# Patient Record
Sex: FEMALE | Race: ASIAN
[De-identification: names, ages, dates, MRNs, and addresses within clinical notes are randomized per-mention and may not be internally consistent; named-entity substitution may affect disease eponyms.]

---

## 2017-04-05 ENCOUNTER — HOSPITAL ENCOUNTER (OUTPATIENT)
Age: 37
Discharge: HOME | End: 2017-04-05
Payer: MEDICAID

## 2017-04-05 DIAGNOSIS — Z12.11: Primary | ICD-10-CM

## 2017-04-05 DIAGNOSIS — K63.4: ICD-10-CM

## 2017-04-05 DIAGNOSIS — K64.0: ICD-10-CM

## 2017-04-05 DIAGNOSIS — Z80.0: ICD-10-CM

## 2017-04-05 PROCEDURE — 45378 DIAGNOSTIC COLONOSCOPY: CPT

## 2017-04-05 PROCEDURE — 0DJD8ZZ INSPECTION OF LOWER INTESTINAL TRACT, VIA NATURAL OR ARTIFICIAL OPENING ENDOSCOPIC: ICD-10-PCS | Performed by: INTERNAL MEDICINE

## 2018-06-18 ENCOUNTER — HOSPITAL ENCOUNTER (OUTPATIENT)
Dept: HOSPITAL 76 - LAB.N | Age: 38
Discharge: HOME | End: 2018-06-18
Attending: FAMILY MEDICINE
Payer: MEDICAID

## 2018-06-18 DIAGNOSIS — M25.561: Primary | ICD-10-CM

## 2018-06-18 DIAGNOSIS — I63.40: ICD-10-CM

## 2018-06-18 DIAGNOSIS — E66.01: ICD-10-CM

## 2018-06-18 PROCEDURE — 86430 RHEUMATOID FACTOR TEST QUAL: CPT

## 2018-06-18 PROCEDURE — 36415 COLL VENOUS BLD VENIPUNCTURE: CPT

## 2018-06-18 PROCEDURE — 85651 RBC SED RATE NONAUTOMATED: CPT

## 2018-06-18 PROCEDURE — 85027 COMPLETE CBC AUTOMATED: CPT

## 2018-06-18 PROCEDURE — 86140 C-REACTIVE PROTEIN: CPT

## 2018-06-18 PROCEDURE — 86038 ANTINUCLEAR ANTIBODIES: CPT

## 2018-06-18 PROCEDURE — 80053 COMPREHEN METABOLIC PANEL: CPT

## 2018-06-19 LAB
ALBUMIN DIAFP-MCNC: 3.4 G/DL
ALBUMIN/GLOB SERPL: 0.9 {RATIO}
ALP SERPL-CCNC: 66 IU/L
ALT SERPL W P-5'-P-CCNC: 20 IU/L
ANION GAP SERPL CALCULATED.4IONS-SCNC: 6 MMOL/L
AST SERPL W P-5'-P-CCNC: 20 IU/L
BILIRUB BLD-MCNC: 0.3 MG/DL
BUN SERPL-MCNC: 9 MG/DL
CALCIUM UR-MCNC: 8.4 MG/DL
CHLORIDE SERPL-SCNC: 103 MMOL/L
CO2 SERPL-SCNC: 25 MMOL/L
CREAT SERPLBLD-SCNC: 0.5 MG/DL
CRP SERPL-MCNC: 1.6 MG/DL
ERYTHROCYTE [DISTWIDTH] IN BLOOD BY AUTOMATED COUNT: 17.7 %
GFRSERPLBLD MDRD-ARVRAT: 138 ML/MIN/{1.73_M2}
GLOBULIN SER-MCNC: 3.8 G/DL
GLUCOSE SERPL-MCNC: 190 MG/DL
HGB UR QL STRIP: 11.2 G/DL
MCH RBC QN AUTO: 23.9 PG
MCHC RBC AUTO-ENTMCNC: 32.2 G/DL
MCV RBC AUTO: 74.3 FL
NEUTROPHILS # SNV AUTO: 7.9 X10^3/UL
PDW BLD AUTO: 7.9 FL
PLATELET # BLD: 297 10^3/UL
PROT SPEC-MCNC: 7.2 G/DL
RBC MAR: 4.69 10^6/UL
RHEUMATOID FACT SER QL: NEGATIVE
SODIUM SERPLBLD-SCNC: 134 MMOL/L

## 2018-06-21 LAB — ANA SER QL: NEGATIVE

## 2019-04-11 ENCOUNTER — HOSPITAL ENCOUNTER (EMERGENCY)
Dept: HOSPITAL 76 - ED | Age: 39
Discharge: HOME | End: 2019-04-11
Payer: MEDICAID

## 2019-04-11 ENCOUNTER — HOSPITAL ENCOUNTER (OUTPATIENT)
Dept: HOSPITAL 76 - EMS | Age: 39
Discharge: ANOTHER HOSPITAL | End: 2019-04-11
Attending: SURGERY
Payer: MEDICAID

## 2019-04-11 VITALS — DIASTOLIC BLOOD PRESSURE: 91 MMHG | SYSTOLIC BLOOD PRESSURE: 152 MMHG

## 2019-04-11 DIAGNOSIS — R41.82: ICD-10-CM

## 2019-04-11 DIAGNOSIS — I10: ICD-10-CM

## 2019-04-11 DIAGNOSIS — R11.2: ICD-10-CM

## 2019-04-11 DIAGNOSIS — R10.31: Primary | ICD-10-CM

## 2019-04-11 DIAGNOSIS — E11.9: ICD-10-CM

## 2019-04-11 DIAGNOSIS — R10.9: Primary | ICD-10-CM

## 2019-04-11 LAB
ALBUMIN DIAFP-MCNC: 3.6 G/DL (ref 3.2–5.5)
ALBUMIN/GLOB SERPL: 0.9 {RATIO} (ref 1–2.2)
ALP SERPL-CCNC: 71 IU/L (ref 42–121)
ALT SERPL W P-5'-P-CCNC: 28 IU/L (ref 10–60)
ANION GAP SERPL CALCULATED.4IONS-SCNC: 11 MMOL/L (ref 6–13)
AST SERPL W P-5'-P-CCNC: 24 IU/L (ref 10–42)
BASOPHILS NFR BLD AUTO: 0.1 10^3/UL (ref 0–0.1)
BASOPHILS NFR BLD AUTO: 0.4 %
BILIRUB BLD-MCNC: 0.4 MG/DL (ref 0.2–1)
BUN SERPL-MCNC: 11 MG/DL (ref 6–20)
CALCIUM UR-MCNC: 8.6 MG/DL (ref 8.5–10.3)
CHLORIDE SERPL-SCNC: 102 MMOL/L (ref 101–111)
CLARITY UR REFRACT.AUTO: CLEAR
CO2 SERPL-SCNC: 24 MMOL/L (ref 21–32)
CREAT SERPLBLD-SCNC: 0.5 MG/DL (ref 0.4–1)
EOSINOPHIL # BLD AUTO: 0 10^3/UL (ref 0–0.7)
EOSINOPHIL NFR BLD AUTO: 0.1 %
ERYTHROCYTE [DISTWIDTH] IN BLOOD BY AUTOMATED COUNT: 16.8 % (ref 12–15)
GFRSERPLBLD MDRD-ARVRAT: 137 ML/MIN/{1.73_M2} (ref 89–?)
GLOBULIN SER-MCNC: 4.1 G/DL (ref 2.1–4.2)
GLUCOSE SERPL-MCNC: 142 MG/DL (ref 70–100)
GLUCOSE UR QL STRIP.AUTO: NEGATIVE MG/DL
HCG UR QL: NEGATIVE
HGB UR QL STRIP: 10.4 G/DL (ref 12–16)
KETONES UR QL STRIP.AUTO: (no result) MG/DL
LIPASE SERPL-CCNC: 40 U/L (ref 22–51)
LYMPHOCYTES # SPEC AUTO: 1.7 10^3/UL (ref 1.5–3.5)
LYMPHOCYTES NFR BLD AUTO: 13 %
MCH RBC QN AUTO: 22.1 PG (ref 27–31)
MCHC RBC AUTO-ENTMCNC: 31.4 G/DL (ref 32–36)
MCV RBC AUTO: 70.2 FL (ref 81–99)
MONOCYTES # BLD AUTO: 0.4 10^3/UL (ref 0–1)
MONOCYTES NFR BLD AUTO: 3.1 %
NEUTROPHILS # BLD AUTO: 10.7 10^3/UL (ref 1.5–6.6)
NEUTROPHILS # SNV AUTO: 12.8 X10^3/UL (ref 4.8–10.8)
NEUTROPHILS NFR BLD AUTO: 83.4 %
NITRITE UR QL STRIP.AUTO: NEGATIVE
PDW BLD AUTO: 7 FL (ref 7.9–10.8)
PH UR STRIP.AUTO: 7.5 PH (ref 5–7.5)
PLATELET # BLD: 314 10^3/UL (ref 130–450)
PROT SPEC-MCNC: 7.7 G/DL (ref 6.7–8.2)
PROT UR STRIP.AUTO-MCNC: NEGATIVE MG/DL
RBC # UR STRIP.AUTO: NEGATIVE /UL
RBC MAR: 4.73 10^6/UL (ref 4.2–5.4)
SODIUM SERPLBLD-SCNC: 137 MMOL/L (ref 135–145)
SP GR UR STRIP.AUTO: 1.01 (ref 1–1.03)
UROBILINOGEN UR QL STRIP.AUTO: (no result) E.U./DL
UROBILINOGEN UR STRIP.AUTO-MCNC: NEGATIVE MG/DL

## 2019-04-11 PROCEDURE — 99283 EMERGENCY DEPT VISIT LOW MDM: CPT

## 2019-04-11 PROCEDURE — 81025 URINE PREGNANCY TEST: CPT

## 2019-04-11 PROCEDURE — 80053 COMPREHEN METABOLIC PANEL: CPT

## 2019-04-11 PROCEDURE — 36415 COLL VENOUS BLD VENIPUNCTURE: CPT

## 2019-04-11 PROCEDURE — 83690 ASSAY OF LIPASE: CPT

## 2019-04-11 PROCEDURE — 87086 URINE CULTURE/COLONY COUNT: CPT

## 2019-04-11 PROCEDURE — 85025 COMPLETE CBC W/AUTO DIFF WBC: CPT

## 2019-04-11 PROCEDURE — 81003 URINALYSIS AUTO W/O SCOPE: CPT

## 2019-04-11 PROCEDURE — 81001 URINALYSIS AUTO W/SCOPE: CPT

## 2019-04-11 PROCEDURE — 99284 EMERGENCY DEPT VISIT MOD MDM: CPT

## 2019-04-11 NOTE — ED PHYSICIAN DOCUMENTATION
PD HPI ABD PAIN





- Stated complaint


Stated Complaint: ABD PX





- Chief complaint


Chief Complaint: Abd Pain





- History obtained from


History obtained from: Patient (In Contrast to the nurse's notes, she speaks 

English fine.)





- History of Present Illness


Timing - onset: Today (All day she has had epigastric nonradiating pain and 

vomiting without diarrhea.  No sick contacts or fever.  She had a remote cholec

ystectomy.)





Review of Systems


Constitutional: denies: Fever, Chills


Throat: denies: Dental pain / toothache, Sore throat


Cardiac: denies: Chest pain / pressure, Palpitations


Respiratory: denies: Dyspnea, Cough





PD PAST MEDICAL HISTORY





- Past Medical History


Past Medical History: Yes


Cardiovascular: Hypertension


Respiratory: None


Endocrine/Autoimmune: Type 2 diabetes


GI: None


: None


HEENT: None


Psych: Depression


Musculoskeletal: None


Derm: None





- Past Surgical History


/GYN:  section, Tubal ligation





- Present Medications


Home Medications: 


                                Ambulatory Orders











 Medication  Instructions  Recorded  Confirmed


 


Acetaminophen 325 mg PO PRN PRN 17


 


Etodolac [Lodine] 400 mg PO BID 17


 


Metformin HCl 1,000 mg PO BID 17


 


Famotidine [Pepcid] 20 mg PO BID #60 tablet 19 














- Allergies


Allergies/Adverse Reactions: 


                                    Allergies











Allergy/AdvReac Type Severity Reaction Status Date / Time


 


No Known Drug Allergies Allergy   Verified 19 17:43














- Social History


Does the pt smoke?: No


Smoking Status: Never smoker


Does the pt drink ETOH?: No


Does the pt have substance abuse?: No





- Immunizations


Immunizations are current?: Yes





PD ED PE NORMAL





- Vitals


Vital signs reviewed: Yes





- General


General: Alert and oriented X 3, No acute distress





- Cardiac


Cardiac: RRR, No murmur





- Respiratory


Respiratory: No respiratory distress, Clear bilaterally





- Abdomen


Abdomen: Other (Moderate epigastric tenderness without surgical signs)





- Back


Back: No CVA TTP, No spinal TTP





- Extremities


Extremities: No edema, No calf tenderness / cord





- Neuro


Neuro: Alert and oriented X 3, Normal speech





Results





- Vitals


Vitals: 


                               Vital Signs - 24 hr











  19





  17:36 18:35


 


Temperature 37.0 C 


 


Heart Rate 96 87


 


Respiratory 16 16





Rate  


 


Blood Pressure 172/89 H 153/94 H


 


O2 Saturation 100 98








                                     Oxygen











O2 Source                      Room air

















- Labs


Labs: 


                                Laboratory Tests











  19





  18:05 18:05 18:52


 


WBC  12.8 H  


 


RBC  4.73  


 


Hgb  10.4 L  


 


Hct  33.2 L  


 


MCV  70.2 L  


 


MCH  22.1 L  


 


MCHC  31.4 L  


 


RDW  16.8 H  


 


Plt Count  314  


 


MPV  7.0 L  


 


Neut # (Auto)  10.7 H  


 


Lymph # (Auto)  1.7  


 


Mono # (Auto)  0.4  


 


Eos # (Auto)  0.0  


 


Baso # (Auto)  0.1  


 


Absolute Nucleated RBC  0.00  


 


Nucleated RBC %  0.0  


 


Sodium   137 


 


Potassium   3.6 


 


Chloride   102 


 


Carbon Dioxide   24 


 


Anion Gap   11.0 


 


BUN   11 


 


Creatinine   0.5 


 


Estimated GFR (MDRD)   137 


 


Glucose   142 H 


 


Calcium   8.6 


 


Total Bilirubin   0.4 


 


AST   24 


 


ALT   28 


 


Alkaline Phosphatase   71 


 


Total Protein   7.7 


 


Albumin   3.6 


 


Globulin   4.1 


 


Albumin/Globulin Ratio   0.9 L 


 


Lipase   40 


 


Urine Color    YELLOW


 


Urine Clarity    CLEAR


 


Urine pH    7.5


 


Ur Specific Gravity    1.010


 


Urine Protein    NEGATIVE


 


Urine Glucose (UA)    NEGATIVE


 


Urine Ketones    TRACE


 


Urine Occult Blood    NEGATIVE


 


Urine Nitrite    NEGATIVE


 


Urine Bilirubin    NEGATIVE


 


Urine Urobilinogen    0.2 (NORMAL)


 


Ur Leukocyte Esterase    NEGATIVE


 


Ur Microscopic Review    NOT INDICATED


 


Urine Culture Comments    NOT INDICATED


 


Urine HCG, Qual    NEGATIVE














PD MEDICAL DECISION MAKING





- ED course


ED course: 





This is a 39-year-old woman with acute epigastric pain and vomiting but no 

diarrhea.  Seems most like gastritis.  She felt much better after IV fluids and 

a GI cocktail although still had pain but declined any pain medication per se.  

She was completely nontender on reevaluation.  Close follow-up and appendicitis 

were precautions were given.





Departure





- Departure


Disposition: 01 Home, Self Care


Clinical Impression: 


Abdominal pain


Qualifiers:


 Abdominal location: epigastric Qualified Code(s): R10.13 - Epigastric pain





Vomiting


Qualifiers:


 Vomiting type: unspecified Vomiting Intractability: non-intractable Nausea 

presence: with nausea Qualified Code(s): R11.2 - Nausea with vomiting, 

unspecified





Condition: Good


Record reviewed to determine appropriate education?: Yes


Instructions:  ED Nausea Vomiting, Abdominal Pain


Prescriptions: 


Famotidine [Pepcid] 20 mg PO BID #60 tablet


Comments: 


Return in 12-18 hours if not better, anytime if worsening symptoms or new 

symptoms develop, especially right lower quadrant pain as I showed you.  Follow-

up with your doctor for reevaluation

## 2022-11-28 ENCOUNTER — HOSPITAL ENCOUNTER (OUTPATIENT)
Dept: HOSPITAL 76 - EMS | Age: 42
Discharge: ANOTHER HOSPITAL | End: 2022-11-28
Payer: COMMERCIAL

## 2022-11-28 ENCOUNTER — HOSPITAL ENCOUNTER (EMERGENCY)
Dept: HOSPITAL 76 - ED | Age: 42
Discharge: HOME | End: 2022-11-28
Payer: COMMERCIAL

## 2022-11-28 VITALS — DIASTOLIC BLOOD PRESSURE: 88 MMHG | SYSTOLIC BLOOD PRESSURE: 130 MMHG

## 2022-11-28 DIAGNOSIS — I10: ICD-10-CM

## 2022-11-28 DIAGNOSIS — E66.9: ICD-10-CM
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
## 2022-11-28 DIAGNOSIS — R51.9: ICD-10-CM
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
## 2022-11-28 DIAGNOSIS — E11.9: ICD-10-CM

## 2022-11-28 DIAGNOSIS — S13.4XXA: Primary | ICD-10-CM

## 2022-11-28 DIAGNOSIS — M25.551: ICD-10-CM

## 2022-11-28 DIAGNOSIS — M54.2: Primary | ICD-10-CM

## 2022-11-28 PROCEDURE — 72040 X-RAY EXAM NECK SPINE 2-3 VW: CPT

## 2022-11-28 PROCEDURE — 73502 X-RAY EXAM HIP UNI 2-3 VIEWS: CPT

## 2022-11-28 PROCEDURE — 99282 EMERGENCY DEPT VISIT SF MDM: CPT

## 2022-11-28 PROCEDURE — 99284 EMERGENCY DEPT VISIT MOD MDM: CPT

## 2022-11-28 RX ADMIN — IBUPROFEN STA MG: 600 TABLET, FILM COATED ORAL at 13:50
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
